# Patient Record
Sex: FEMALE | Race: WHITE | NOT HISPANIC OR LATINO | Employment: OTHER | URBAN - METROPOLITAN AREA
[De-identification: names, ages, dates, MRNs, and addresses within clinical notes are randomized per-mention and may not be internally consistent; named-entity substitution may affect disease eponyms.]

---

## 2017-10-05 ENCOUNTER — OFFICE VISIT (OUTPATIENT)
Dept: OBGYN | Facility: CLINIC | Age: 57
End: 2017-10-05
Payer: COMMERCIAL

## 2017-10-05 VITALS
HEART RATE: 71 BPM | OXYGEN SATURATION: 96 % | SYSTOLIC BLOOD PRESSURE: 118 MMHG | BODY MASS INDEX: 39.34 KG/M2 | WEIGHT: 213.8 LBS | HEIGHT: 62 IN | DIASTOLIC BLOOD PRESSURE: 82 MMHG

## 2017-10-05 DIAGNOSIS — Z01.419 ENCOUNTER FOR GYNECOLOGICAL EXAMINATION WITHOUT ABNORMAL FINDING: Primary | ICD-10-CM

## 2017-10-05 DIAGNOSIS — Z23 NEED FOR PROPHYLACTIC VACCINATION AND INOCULATION AGAINST INFLUENZA: ICD-10-CM

## 2017-10-05 DIAGNOSIS — R35.0 URINARY FREQUENCY: ICD-10-CM

## 2017-10-05 LAB
ALBUMIN UR-MCNC: NEGATIVE MG/DL
APPEARANCE UR: CLEAR
BACTERIA #/AREA URNS HPF: ABNORMAL /HPF
BILIRUB UR QL STRIP: NEGATIVE
COLOR UR AUTO: YELLOW
GLUCOSE UR STRIP-MCNC: NEGATIVE MG/DL
HGB UR QL STRIP: NEGATIVE
KETONES UR STRIP-MCNC: NEGATIVE MG/DL
LEUKOCYTE ESTERASE UR QL STRIP: ABNORMAL
NITRATE UR QL: NEGATIVE
NON-SQ EPI CELLS #/AREA URNS LPF: ABNORMAL /LPF
PH UR STRIP: 5 PH (ref 5–7)
RBC #/AREA URNS AUTO: ABNORMAL /HPF
SOURCE: ABNORMAL
SP GR UR STRIP: 1.01 (ref 1–1.03)
UROBILINOGEN UR STRIP-MCNC: NORMAL MG/DL (ref 0–2)
WBC #/AREA URNS AUTO: ABNORMAL /HPF

## 2017-10-05 PROCEDURE — 81001 URINALYSIS AUTO W/SCOPE: CPT | Performed by: OBSTETRICS & GYNECOLOGY

## 2017-10-05 PROCEDURE — 90686 IIV4 VACC NO PRSV 0.5 ML IM: CPT | Performed by: OBSTETRICS & GYNECOLOGY

## 2017-10-05 PROCEDURE — 99396 PREV VISIT EST AGE 40-64: CPT | Mod: 25 | Performed by: OBSTETRICS & GYNECOLOGY

## 2017-10-05 PROCEDURE — 90471 IMMUNIZATION ADMIN: CPT | Performed by: OBSTETRICS & GYNECOLOGY

## 2017-10-05 NOTE — MR AVS SNAPSHOT
"              After Visit Summary   10/5/2017    Amanda Simeon    MRN: [id number]           Patient Information     Date Of Birth          1960        Visit Information        Provider Department      10/5/2017 9:30 AM Christiano Thakur [place] [facility]        Today's Diagnoses     Encounter for gynecological examination without abnormal finding    -  1    Urinary frequency        Need for prophylactic vaccination and inoculation against influenza           Follow-ups after your visit        Follow-up notes from your care team     Return in about 1 year (around 10/5/2018) for Physical Exam.      Who to contact     If you have questions or need follow up information about today's clinic visit or your schedule please contact [facility] directly at [phone].  Normal or non-critical lab and imaging results will be communicated to you by MyChart, letter or phone within 4 business days after the clinic has received the results. If you do not hear from us within 7 days, please contact the clinic through [employer] or phone. If you have a critical or abnormal lab result, we will notify you by phone as soon as possible.  Submit refill requests through [employer] or call your pharmacy and they will forward the refill request to us. Please allow 3 business days for your refill to be completed.          Additional Information About Your Visit        [employer] Information     [employer] lets you send messages to your doctor, view your test results, renew your prescriptions, schedule appointments and more. To sign up, go to [email or web address] . Click on \"Log in\" on the left side of the screen, which will take you to the Welcome page. Then click on \"Sign up Now\" on the right side of the page.     You will be asked to enter the access code listed below, as well as some personal information. Please follow the directions to create your username and password.     Your access code is: " "[email or web address]  Expires: 1/3/2018  1:38 PM     Your access code will  in 90 days. If you need help or a new code, please call your [facility] clinic or [phone].        Care EveryWhere ID     This is your Care EveryWhere ID. This could be used by other organizations to access your [facility] medical records  [id number]        Your Vitals Were     Pulse Height Pulse Oximetry BMI (Body Mass Index)          71 5' 1.5\" (1.562 m) 96% 39.74 kg/m2         Blood Pressure from Last 3 Encounters:   10/05/17 118/82   10/20/16 118/74   10/08/15 120/75    Weight from Last 3 Encounters:   10/05/17 213 lb 12.8 oz (97 kg)   10/20/16 210 lb 12.8 oz (95.6 kg)   10/08/15 225 lb (102.1 kg)              We Performed the Following     *UA reflex to Microscopic and Culture ([place]; [facility]; [facility]; [facility]; [facility]; [facility]; [place]; [place])     FLU VAC, SPLIT VIRUS IM > 3 YO (QUADRIVALENT) [[id number]]     Urine Microscopic     Vaccine Administration, Initial [[id number]]        Primary Care Provider Office Phone # Fax #    [facility] [phone] [address]  [facility] [address]        Equal Access to Services     TAYLER CASTILLO : Hadii aad ku hadasho Soomaali, waaxda luqadaha, qaybta kaalmada adeegyada, waxay debin haytiera winn. So [facility] [phone].    ATENCIÓN: Si habla español, tiene a boucher disposición servicios gratuitos de asistencia lingüística. Llame al [phone].    We comply with applicable federal civil rights laws and [place] laws. We do not discriminate on the basis of race, color, national origin, age, disability, sex, sexual orientation, or gender identity.            Thank you!     Thank you for choosing [facility]  [place] your [place]. Our goal is always to provide you with excellent care. Hearing back from our patients is one way we can continue to improve our services. Please take a few minutes to complete " the written survey that you may receive in the mail after your visit with us. Thank you!             Your Updated Medication List - Protect others around you: Learn how to safely use, store and throw away your medicines at www.disposemymeds.org.          This list is accurate as of: 10/5/17  1:38 PM.  Always use your most recent med list.                   Brand Name Dispense Instructions for use Diagnosis    ASPIRIN PO      Take 81 mg by mouth daily    Rectal cancer (H)       CALCIUM + D PO      Take 2 tablets by mouth daily.        lactobacillus acidophilus Tabs      Take 1 tablet by mouth 3 times daily (before meals)    Personal history of urinary tract infection       lisinopril 10 MG tablet    PRINIVIL/ZESTRIL     Take  by mouth. Takes half tab daily.        LORazepam 0.5 MG tablet    ATIVAN    20 tablet    Take 1 tablet (0.5 mg) by mouth every 8 hours as needed for anxiety    Rectal cancer (H)       MULTIVITAL PO      Take 1 tablet by mouth daily.        niacin 250 MG tablet      Take 250 mg by mouth daily (with breakfast)        TYLENOL 325 MG tablet   Generic drug:  acetaminophen      Take 1 tablet by mouth as needed.        vitamin B complex with vitamin C Tabs tablet      Take 1 tablet by mouth daily        vitamin D 1000 UNITS capsule      Take 1 capsule by mouth daily.

## 2017-10-05 NOTE — PROGRESS NOTES
Injectable Influenza Immunization Documentation    1.  Is the person to be vaccinated sick today?   No    2. Does the person to be vaccinated have an allergy to a component   of the vaccine?   No    3. Has the person to be vaccinated ever had a serious reaction   to influenza vaccine in the past?   No    4. Has the person to be vaccinated ever had Guillain-Barré syndrome?   No    Form completed by patient  CASSANDRA Valentin 10/5/2017

## 2017-10-05 NOTE — PROGRESS NOTES
Amanda Simeon is a 57 year old female , who presents for annual exam.   No unusual bleeding, no incontinence, or unusual discharge.   She has had some urinary frequency.  She was treated with 5 day of Bactrim a couple of weeks ago, but the symptoms seem to continue.    Past Medical History:   Diagnosis Date     HTN      Rectal cancer (H) 2009     Rectovaginal fistula        Past Surgical History:   Procedure Laterality Date     HYSTERECTOMY       HYSTERECTOMY, PAP NO LONGER INDICATED       low anterior resection of colon  2009    T3N2 moderately differentiated cancer     OOPHORECTOMY      With one sided oophorectomy for fibroid     ostomy reversal  2010     TONSILLECTOMY         Obstetric History       T0      L1     SAB0   TAB0   Ectopic0   Multiple0   Live Births1       # Outcome Date GA Lbr Jacoby/2nd Weight Sex Delivery Anes PTL Lv   1  02 27w5d  2 lb 2 oz (0.964 kg) F  EPI Y MALCOLM      Complications: Abruptio Placenta          Gyn History:  Gynecological History         No LMP recorded. Patient has had a hysterectomy.     no STD/no PID/no IUD      history of abnormal pap smear:  no  Last pap:   Lab Results   Component Value Date    PAP NIL 10/03/2013           Current Outpatient Prescriptions   Medication Sig Dispense Refill     vitamin B complex with vitamin C (VITAMIN  B COMPLEX) TABS tablet Take 1 tablet by mouth daily       Lactobacillus Acid-Pectin (LACTOBACILLUS ACIDOPHILUS) TABS Take 1 tablet by mouth 3 times daily (before meals)       ASPIRIN PO Take 81 mg by mouth daily       LORazepam (ATIVAN) 0.5 MG tablet Take 1 tablet (0.5 mg) by mouth every 8 hours as needed for anxiety 20 tablet 0     niacin 250 MG tablet Take 250 mg by mouth daily (with breakfast)       Cholecalciferol (VITAMIN D) 1000 UNIT capsule Take 1 capsule by mouth daily.       acetaminophen (TYLENOL) 325 MG tablet Take 1 tablet by mouth as needed.       LISINOPRIL 10 MG OR TABS Take   "by mouth. Takes half tab daily.        MULTIVITAL OR Take 1 tablet by mouth daily.       Calcium-Vitamin D (CALCIUM + D PO) Take 2 tablets by mouth daily.         Allergies   Allergen Reactions     Dilaudid [Hydromorphone Hcl] Rash       Social History     Social History     Marital status:      Spouse name: Wong     Number of children: 1     Years of education: N/A     Occupational History      Unemployed      Stay At Home Parent     Social History Main Topics     Smoking status: Never Smoker     Smokeless tobacco: Never Used     Alcohol use No     Drug use: No     Sexual activity: Yes     Partners: Male     Birth control/ protection: Surgical     Other Topics Concern      Service No     Blood Transfusions Yes     Caffeine Concern No     Occupational Exposure No     Hobby Hazards No     Sleep Concern No     Stress Concern Yes     Special Diet No     Back Care No     Exercise Not Asked     walks several times a week     Bike Helmet Yes     Seat Belt Yes     Self-Exams Yes     Social History Narrative       Family History   Problem Relation Age of Onset     CANCER Paternal Grandmother      Colon cancer age 56     Cardiovascular Mother      CHF     Arthritis Mother      Cardiovascular Father      CHF     Arthritis Father      Neurologic Disorder Brother 53     Parkinsons     CANCER Brother      NMSC         ROS:  All negative except as above.      EXAM:  /82 (BP Location: Right arm, Cuff Size: Adult Regular)  Pulse 71  Ht 5' 1.5\" (1.562 m)  Wt 213 lb 12.8 oz (97 kg)  SpO2 96%  BMI 39.74 kg/m2  General:  WNWD female, NAD  Alert  Oriented x 3  Gait:  Normal  Skin:  Normal skin turgor  Neurologic:  CN grossly intact, good sensation.    HEENT:  NC/AT, EOMI  Neck:  No masses palpated, symmetrical, carotids +2/4, no bruits heard  Heart:  RRR  Lungs:  Clear   Breasts:  Symmetrical, no dimpling noted, no masses palpated, no discharge expressed  Abdomen:  Non-tender, non-distended.  Vulva: No " external lesions, normal hair distribution, no adenopathy  BUS:  Normal, no masses noted  Urethra:  No hypermobility noted  Urethral meatus:  No masses noted  Vagina: atrophic changes.  No masses noted.   Cervix: absent   Uterus: absent   Ovaries/Bimanual: No mass, non-tender, mobile  Perianal:  No masses noted.    Rectal exam: declined since she will have the colonoscopy in March.   Extremities:  No clubbing, cyanosis, or edema      ASSESSMENT/PLAN   Annual examination   Check U/A today since the frequency continues.  The U/A results are negative.  If symptoms continue, then repeat.    Low fat diet, weight bearing exercises and self breast exams on a monthly basis have been recommended.  I have discussed with patient the risks, benefits, medications, treatment options and modalities.   I have instructed the patient to call or schedule a follow-up appointment if any problems.

## 2017-10-31 ENCOUNTER — TRANSFERRED RECORDS (OUTPATIENT)
Dept: HEALTH INFORMATION MANAGEMENT | Facility: CLINIC | Age: 57
End: 2017-10-31

## 2018-02-26 ENCOUNTER — TELEPHONE (OUTPATIENT)
Dept: OBGYN | Facility: CLINIC | Age: 58
End: 2018-02-26

## 2018-02-26 NOTE — TELEPHONE ENCOUNTER
[facility] Call Center    Phone Message    May a detailed message be left on voicemail: no    Reason for Call: Other: Patient has vaginal bleeding.  Asking for a nurse to call her back.  Did not want FNA.  Please call. [phone]     Action Taken: Message routed to:  [facility] p [phone]

## 2018-02-26 NOTE — TELEPHONE ENCOUNTER
"Return call to patient. Reported \"The strangest most bizarre thing.\" Patient reported hx of colorectal cancer in 2009 & a known fistula which she has discussed with Dr. Thakur. Patient stated she has colonoscopy 03-19-18 with Dr. Mcfarland. Patient reported episodes where she has to strain when passing stool and then will note blood on the toilet tissue. She has to wipe 2-3 times before all the blood is gone. Patient stated these episodes were about 8-10 days apart. She had 2 episodes within 48 hrs and a total of 5 episodes recently. Patient stated she was advised by Dr. Mcfarland to consult GYN while she waits for colonoscopy to make sure blood is not coming from vaginal area. Patient stated she does have one ovary. Offered patient an appt tomorrow with Dr. Thakur. Patient declined due to distance from clinic. Requested to be seen at [facility]. Scheduled per patient's request on 03-01-18 at 1015. Patient appreciative of assistance. Amy Toth RN, BAN    "

## 2018-02-28 ENCOUNTER — TELEPHONE (OUTPATIENT)
Dept: SURGERY | Facility: CLINIC | Age: 58
End: 2018-02-28

## 2018-02-28 DIAGNOSIS — Z85.048 HISTORY OF RECTAL CANCER: Primary | ICD-10-CM

## 2018-02-28 NOTE — TELEPHONE ENCOUNTER
Per task, patient is scheduled 3/13/18 at 1:00 pm ([place]).  Called and spoke with patient.  Patient confirms date, time, location.  Informed patient she will receive appointment information in the mail as well.  Patient verbalized understanding.

## 2018-02-28 NOTE — PROGRESS NOTES
Called patient and talked with her about her symptoms she is experiencing the last several months. . She states that She is having both blood and stool out her vagina. She says that this is happening every week to two weeks. She does say that her stool constituency has changed, they have become harder, and she does have to strain at times. Patient states that she is seeing gyn tomorrow.  Patient symptoms were discussed with Dr. Mcfarland and he would like the patient to have a 3T MRI before the colonoscopy she has scheduled on 3/19. Patient verbalizes understanding of plan.

## 2018-02-28 NOTE — TELEPHONE ENCOUNTER
----- Message from Emily Perez RN sent at 2/28/2018 10:40 AM CST -----  Hello,    Can you please schedule her for a 3 T MRI. I would have just had the patient do it but we need it before 3/19 when she is scheduled for the colonoscopy. Patient states that Wednesdays and this Friday do not work for her:)    Thanks,  Emily

## 2018-03-01 ENCOUNTER — TELEPHONE (OUTPATIENT)
Dept: OBGYN | Facility: CLINIC | Age: 58
End: 2018-03-01

## 2018-03-01 ENCOUNTER — OFFICE VISIT (OUTPATIENT)
Dept: OBGYN | Facility: CLINIC | Age: 58
End: 2018-03-01
Payer: COMMERCIAL

## 2018-03-01 VITALS
BODY MASS INDEX: 38.66 KG/M2 | SYSTOLIC BLOOD PRESSURE: 115 MMHG | HEART RATE: 88 BPM | WEIGHT: 208 LBS | DIASTOLIC BLOOD PRESSURE: 77 MMHG | OXYGEN SATURATION: 98 %

## 2018-03-01 DIAGNOSIS — N95.2 VAGINAL ATROPHY: Primary | ICD-10-CM

## 2018-03-01 DIAGNOSIS — N82.4 DIGESTIVE-GENITAL TRACT FISTULA, FEMALE: ICD-10-CM

## 2018-03-01 DIAGNOSIS — C20 RECTAL CANCER (H): ICD-10-CM

## 2018-03-01 DIAGNOSIS — N93.9 VAGINAL BLEEDING: Primary | ICD-10-CM

## 2018-03-01 PROCEDURE — 99214 OFFICE O/P EST MOD 30 MIN: CPT | Performed by: OBSTETRICS & GYNECOLOGY

## 2018-03-01 RX ORDER — ESTRADIOL 10 UG/1
INSERT VAGINAL
Qty: 24 TABLET | Refills: 0 | Status: SHIPPED | OUTPATIENT
Start: 2018-03-01 | End: 2018-05-03

## 2018-03-01 NOTE — NURSING NOTE
"Chief Complaint   Patient presents with     Abnormal Uterine Bleeding     Had bleeding last Saturday Dr. Mcfarland he [place] doctor wanted her seen.       Initial /77 (BP Location: Right arm, Cuff Size: Adult Large)  Pulse 88  Wt 208 lb (94.3 kg)  SpO2 98%  BMI 38.66 kg/m2 Estimated body mass index is 38.66 kg/(m^2) as calculated from the following:    Height as of 10/5/17: 5' 1.5\" (1.562 m).    Weight as of this encounter: 208 lb (94.3 kg).  Medication Reconciliation: complete   CASSANDRA Valentin 3/1/2018         "

## 2018-03-01 NOTE — TELEPHONE ENCOUNTER
[facility] Call Center    Phone Message    May a detailed message be left on voicemail: yes    Reason for Call: Other: patient has ? regarding script from appt today- please advise.     Action Taken: Message routed to:  [facility] p [phone]

## 2018-03-01 NOTE — PROGRESS NOTES
Amanda Simeon is a 57 year old female, .  She presents today at the request of Dr. Mcfarland.    On  she had some spotting (dull red with wiping) and had urinary frequency and urgency.  Last fall when she had a UTI, she had some blood.  She did not have dysuria.   She was started on Cipro and a week later, she had UTI noted.    She had inserted a tampon and she had some blood on the applicator.  She checked for the tampon.  The tampon was lost and likely expelled.   About 8 days later she had some blood again and she had noted some stool with the blood.  It resolved.  Last week it happened again.    She tried Metamucil and lots of water.  She had similar blood a couple of days later.  It was less than previously.   GI wanted her to be seen by Gynecology to rule out gyn etiology.  She had a hysterectomy .    She had the diagnosis of rectal cancer in  with the reanastomosis.  She has a history of a fistula.        Past Medical History:   Diagnosis Date     HTN      Rectal cancer (H) 2009     Rectovaginal fistula        Past Surgical History:   Procedure Laterality Date     HYSTERECTOMY       HYSTERECTOMY, PAP NO LONGER INDICATED       low anterior resection of colon  2009    T3N2 moderately differentiated cancer     OOPHORECTOMY      With one sided oophorectomy for fibroid     ostomy reversal  2010     TONSILLECTOMY         Obstetric History       T0      L1     SAB0   TAB0   Ectopic0   Multiple0   Live Births1       # Outcome Date GA Lbr Jacoby/2nd Weight Sex Delivery Anes PTL Lv   1  02 27w5d  2 lb 2 oz (0.964 kg) F  EPI Y MALCOLM      Complications: Abruptio Placenta           Allergies   Allergen Reactions     Dilaudid [Hydromorphone Hcl] Rash       Current Outpatient Prescriptions   Medication Sig Dispense Refill     vitamin B complex with vitamin C (VITAMIN  B COMPLEX) TABS tablet Take 1 tablet by mouth daily       Lactobacillus Acid-Pectin  (LACTOBACILLUS ACIDOPHILUS) TABS Take 1 tablet by mouth 3 times daily (before meals)       ASPIRIN PO Take 81 mg by mouth daily       LORazepam (ATIVAN) 0.5 MG tablet Take 1 tablet (0.5 mg) by mouth every 8 hours as needed for anxiety 20 tablet 0     niacin 250 MG tablet Take 250 mg by mouth daily (with breakfast)       Cholecalciferol (VITAMIN D) 1000 UNIT capsule Take 1 capsule by mouth daily.       Calcium-Vitamin D (CALCIUM + D PO) Take 2 tablets by mouth daily.       acetaminophen (TYLENOL) 325 MG tablet Take 1 tablet by mouth as needed.       LISINOPRIL 10 MG OR TABS Take  by mouth. Takes half tab daily.        MULTIVITAL OR Take 1 tablet by mouth daily.         Social History     Social History     Marital status:      Spouse name: Wong     Number of children: 1     Years of education: N/A     Occupational History      Unemployed      Stay At Home Parent     Social History Main Topics     Smoking status: Never Smoker     Smokeless tobacco: Never Used     Alcohol use No     Drug use: No     Sexual activity: Yes     Partners: Male     Birth control/ protection: Surgical     Other Topics Concern      Service No     Blood Transfusions Yes     Caffeine Concern No     Occupational Exposure No     Hobby Hazards No     Sleep Concern No     Stress Concern Yes     Special Diet No     Back Care No     Exercise Not Asked     walks several times a week     Bike Helmet Yes     Seat Belt Yes     Self-Exams Yes     Social History Narrative       Family History   Problem Relation Age of Onset     CANCER Paternal Grandmother      Colon cancer age 56     Cardiovascular Mother      CHF     Arthritis Mother      Cardiovascular Father      CHF     Arthritis Father      Neurologic Disorder Brother 53     Parkinsons     CANCER Brother      NMSC         Review of Systems:  10 point ROS of systems including Constitutional, Eyes, Respiratory, Cardiovascular, Gastroenterology, Genitourinary, Integumentary,  Muscularskeletal, Psychiatric were all negative except for pertinent positives noted in my HPI and in the PMH.          EXAM:  /77 (BP Location: Right arm, Cuff Size: Adult Large)  Pulse 88  Wt 208 lb (94.3 kg)  SpO2 98%  BMI 38.66 kg/m2  Body mass index is 38.66 kg/(m^2).  General Appearance:  healthy, alert, active, no distress  Skin:  Normal skin turgor  Neuro:  Alert, cranial nerves grossly intact  HEENT: NCAT  Neck:  No masses or lesions carotids are +2/4. No bruits heard  Lungs:  Good respiratory effort   Abdomen: Soft, nontender.  Normal bowel sounds.  No masses  Vulva: No external lesions, normal hair distribution, no adenopathy  BUS:  Normal, no masses noted  Urethral meatus:  No masses noted  Urethra:  No hypermobility  Vagina: bleeding is seen from the vaginal cuff likely with irritation of the speculum.    Cervix: absent   Uterus: absent   Ovaries/Bimanual: No masses  Extremities:  No clubbing, cyanosis or edema.        ASSESSMENT:  Vaginal bleeding   History of rectal cancer  History of fistula       PLAN:  With the vaginal irritation with the speculum, I suspect the bleeding is vaginal origin.   I suggest to use the topical estrogen to see if that might help.  Vaginal cream and tablets discussed.  She is interested in the Vagifem tablets.  This should help if the bleeding is from atrophic changes.  Medication information reviewed with her.  She will continue with the plan for the colonoscopy and the GI evaluation.  Should the bleeding continue, then she needs to return for further evaluation.       Christiano Thakur MD

## 2018-03-06 RX ORDER — ESTRADIOL 0.1 MG/G
CREAM VAGINAL DAILY
Qty: 60 G | Refills: 1 | Status: SHIPPED | OUTPATIENT
Start: 2018-03-06 | End: 2021-10-14

## 2018-03-06 NOTE — TELEPHONE ENCOUNTER
I called patient  We reviewed options.  She is willing to go with the estrace cream.  Instructions reviewed.   Christiano Thakur MD

## 2018-03-08 ENCOUNTER — TELEPHONE (OUTPATIENT)
Dept: OBGYN | Facility: CLINIC | Age: 58
End: 2018-03-08

## 2018-03-08 NOTE — TELEPHONE ENCOUNTER
estradiol (ESTRACE) 0.1 MG/GM cream 60 g 1 3/6/2018  --   Sig: Place vaginally daily     Patient called wondering how much med to use as vaginal applicator has 1-4 gm on it. Explained will clarify with Dr. Thakur and call back with orders. Can leave message on cell at time of call back if needed.   Amy Toth RN, BAN

## 2018-03-08 NOTE — TELEPHONE ENCOUNTER
Message handled by Nurse Triage with [facility] - provider name: Dr. Thakur   2 grams daily. Patient is to return to clinic in April after she sees GI for recheck.  Phone to patient. Gave orders per Dr. Thakur as below. Patient stated Rx cost $258. Assisted patient with scheduling a f/u appt on 04-19-18. Patient appreciative of assistance. Amy Toth RN, CORBY

## 2018-03-12 ENCOUNTER — TELEPHONE (OUTPATIENT)
Dept: GASTROENTEROLOGY | Facility: OUTPATIENT CENTER | Age: 58
End: 2018-03-12

## 2018-03-13 ENCOUNTER — HOSPITAL ENCOUNTER (OUTPATIENT)
Dept: MRI IMAGING | Facility: CLINIC | Age: 58
Discharge: HOME | End: 2018-03-13
Attending: COLON & RECTAL SURGERY | Admitting: COLON & RECTAL SURGERY
Payer: COMMERCIAL

## 2018-03-13 DIAGNOSIS — Z85.048 HISTORY OF RECTAL CANCER: ICD-10-CM

## 2018-03-13 PROCEDURE — 25000128 H RX IP 250 OP 636: Performed by: RADIOLOGY

## 2018-03-13 PROCEDURE — A9585 GADOBUTROL INJECTION: HCPCS | Performed by: RADIOLOGY

## 2018-03-13 PROCEDURE — 72197 MRI PELVIS W/O & W/DYE: CPT

## 2018-03-13 RX ORDER — GADOBUTROL 604.72 MG/ML
10 INJECTION INTRAVENOUS ONCE
Status: COMPLETED | OUTPATIENT
Start: 2018-03-13 | End: 2018-03-13

## 2018-03-13 RX ADMIN — GADOBUTROL 10 ML: 604.72 INJECTION INTRAVENOUS at 14:11

## 2018-03-19 ENCOUNTER — DOCUMENTATION ONLY (OUTPATIENT)
Dept: GASTROENTEROLOGY | Facility: OUTPATIENT CENTER | Age: 58
End: 2018-03-19
Payer: COMMERCIAL

## 2018-03-19 ENCOUNTER — TRANSFERRED RECORDS (OUTPATIENT)
Dept: HEALTH INFORMATION MANAGEMENT | Facility: CLINIC | Age: 58
End: 2018-03-19

## 2018-03-21 LAB — COPATH REPORT: NORMAL

## 2018-04-03 ENCOUNTER — TELEPHONE (OUTPATIENT)
Dept: SURGERY | Facility: CLINIC | Age: 58
End: 2018-04-03

## 2018-04-03 NOTE — TELEPHONE ENCOUNTER
Amanda is calling for Dr. Mcfarland.  She reports she had MRI prior to colonoscopy due to possible vaginal bleeding.  After colonoscopy thought maybe hemorrhoid near fistula was the cause of bleeding?    1. Advised metamucil, any other recommendations?  2. Need follow up in clinic?  3. At what point is there too much bleeding?  4. Her Gyn put her on estradiol cream at a cost of $258 per month.  She does not feel it's helping her bleeding issue.  At this cost would prefer changing to something else.  Gyn would need to order.  5. Bleeding originally sporadic every 5-7 days.  Then in January every 12-14 days.  Then February more frequent every 7-10 days. Since colonoscopy 3/19 has been 14 days.    6. Still unclear to here where bleeding is coming from.  Not always with BM, light amount of blood with wiping. She suspects not from vaginal area.  7.  She thought she understood there was suspicion prior / during colonoscopy, but nothing on report.     8. Would like to clarify this and have a plan.      Will route to Dr. Bruna Diaz's clinic nurse.

## 2018-05-03 ENCOUNTER — OFFICE VISIT (OUTPATIENT)
Dept: OBGYN | Facility: CLINIC | Age: 58
End: 2018-05-03
Payer: COMMERCIAL

## 2018-05-03 VITALS
HEART RATE: 79 BPM | DIASTOLIC BLOOD PRESSURE: 79 MMHG | BODY MASS INDEX: 39.15 KG/M2 | WEIGHT: 210.6 LBS | OXYGEN SATURATION: 98 % | SYSTOLIC BLOOD PRESSURE: 119 MMHG

## 2018-05-03 DIAGNOSIS — N95.2 VAGINAL ATROPHY: Primary | ICD-10-CM

## 2018-05-03 DIAGNOSIS — N89.8 VAGINAL IRRITATION: ICD-10-CM

## 2018-05-03 DIAGNOSIS — N93.9 VAGINAL BLEEDING: ICD-10-CM

## 2018-05-03 PROCEDURE — 99213 OFFICE O/P EST LOW 20 MIN: CPT | Performed by: OBSTETRICS & GYNECOLOGY

## 2018-05-03 NOTE — NURSING NOTE
"Chief Complaint   Patient presents with     RECHECK     follow up estrace vaginal cream for bleeding       Initial /79 (BP Location: Right arm, Cuff Size: Adult Large)  Pulse 79  Wt 210 lb 9.6 oz (95.5 kg)  SpO2 98%  BMI 39.15 kg/m2 Estimated body mass index is 39.15 kg/(m^2) as calculated from the following:    Height as of 10/5/17: 5' 1.5\" (1.562 m).    Weight as of this encounter: 210 lb 9.6 oz (95.5 kg).  Medication Reconciliation: complete   CASSANDRA Valentin 5/3/2018         "

## 2018-05-06 NOTE — PROGRESS NOTES
Amanda is a 57 year old  here for follow up of vaginal estrogen cream.  She has seen her GI physician and no evidence of fistula is seen.    She had 3 episodes of bleeding in .  It was a lighter pink color.  She uses the estrogen cream 1-2 times a week.  She has had less irritations of the vulvar and vaginal tissues with the use.  She has no contraindications to continue use of the cream.   She would like to continue with the medication, buth er  works for [employer] and is expected to have a forced residential at age 59.   He was training for another position, but a supervisor from another site had another individual in mind for the hire.  They will likely have some cobra coverage, but she isn't sure about how long or what coverage will look like.         Past Medical History:   Diagnosis Date     HTN      Rectal cancer (H) 2009     Rectovaginal fistula        Past Surgical History:   Procedure Laterality Date     HYSTERECTOMY       HYSTERECTOMY, PAP NO LONGER INDICATED       low anterior resection of colon  2009    T3N2 moderately differentiated cancer     OOPHORECTOMY      With one sided oophorectomy for fibroid     ostomy reversal  2010     TONSILLECTOMY            Allergies   Allergen Reactions     Dilaudid [Hydromorphone Hcl] Rash       Current Outpatient Prescriptions   Medication Sig Dispense Refill     acetaminophen (TYLENOL) 325 MG tablet Take 1 tablet by mouth as needed.       ASPIRIN PO Take 81 mg by mouth daily       Calcium-Vitamin D (CALCIUM + D PO) Take 2 tablets by mouth daily.       Cholecalciferol (VITAMIN D) 1000 UNIT capsule Take 1 capsule by mouth daily.       estradiol (ESTRACE) 0.1 MG/GM cream Place vaginally daily 60 g 1     Lactobacillus Acid-Pectin (LACTOBACILLUS ACIDOPHILUS) TABS Take 1 tablet by mouth 3 times daily (before meals)       LISINOPRIL 10 MG OR TABS Take  by mouth. Takes half tab daily.        LORazepam (ATIVAN) 0.5 MG tablet Take 1  tablet (0.5 mg) by mouth every 8 hours as needed for anxiety 20 tablet 0     MULTIVITAL OR Take 1 tablet by mouth daily.       niacin 250 MG tablet Take 250 mg by mouth daily (with breakfast)       vitamin B complex with vitamin C (VITAMIN  B COMPLEX) TABS tablet Take 1 tablet by mouth daily         Social History     Social History     Marital status:      Spouse name: Wong     Number of children: 1     Years of education: N/A     Occupational History      Unemployed      Stay At Home Parent     Social History Main Topics     Smoking status: Never Smoker     Smokeless tobacco: Never Used     Alcohol use No     Drug use: No     Sexual activity: Yes     Partners: Male     Birth control/ protection: Surgical     Other Topics Concern      Service No     Blood Transfusions Yes     Caffeine Concern No     Occupational Exposure No     Hobby Hazards No     Sleep Concern No     Stress Concern Yes     Special Diet No     Back Care No     Exercise Not Asked     walks several times a week     Bike Helmet Yes     Seat Belt Yes     Self-Exams Yes     Social History Narrative       Family History   Problem Relation Age of Onset     CANCER Paternal Grandmother      Colon cancer age 56     Cardiovascular Mother      CHF     Arthritis Mother      Cardiovascular Father      CHF     Arthritis Father      Neurologic Disorder Brother 53     Parkinsons     CANCER Brother      NMSC       Review of Systems:  10 point ROS of systems including Constitutional, Eyes, Respiratory, Cardiovascular, Gastroenterology, Genitourinary, Integumentary, Muscularskeletal, Psychiatric were all negative except for pertinent positives noted in my HPI and in the PMH.      Exam  /79 (BP Location: Right arm, Cuff Size: Adult Large)  Pulse 79  Wt 210 lb 9.6 oz (95.5 kg)  SpO2 98%  BMI 39.15 kg/m2  General:  WNWD female, NAD  Alert  Oriented x 3  Gait:  Normal  Skin:  Normal skin turgor  HEENT:  NC/AT, EOMI  Lungs:  Good respiratory  effort   Abdomen:  Non-tender, non-distended.  Pelvic exam:  Not performed, since symptoms have improved.   Extremities:  No clubbing, no cyanosis and no edema.      Assessment  Vaginal atrophy  Vaginal bleeding  Vaginal irritation      Plan  At this time she desires to continue with the estrogen cream.  We might need to look at some alternatives should her insurance coverage change drastically.  She will let me know questions.     TT 20-25 min  CT greater than 50%    MD Christiano Ambriz MD

## 2018-07-12 ENCOUNTER — TELEPHONE (OUTPATIENT)
Dept: OBGYN | Facility: CLINIC | Age: 58
End: 2018-07-12

## 2018-07-12 DIAGNOSIS — N95.2 VAGINAL ATROPHY: ICD-10-CM

## 2018-07-12 DIAGNOSIS — N93.9 VAGINAL BLEEDING: ICD-10-CM

## 2018-07-12 NOTE — TELEPHONE ENCOUNTER
ESTRADIOL 0.1MG/GM CREAM 60 g 3 5/6/2018  --   Sig: Insert 1 gram vaginally 2 times each week     Unsure if Dr. Thakur can order a larger tube. Preferred pharmacy entered.   Will route to Dr. hTakur for review & orders. Amy Toth RN, BAN

## 2018-07-12 NOTE — TELEPHONE ENCOUNTER
"Patient is requesting that the [facility]adoil [facility]- please let patient know- she gets 25 applications out of one tube, she is requesting that two tubes be ordered for one \"dose\"  "

## 2018-07-12 NOTE — TELEPHONE ENCOUNTER
I don't think I can write it that way as she is going to use it twice weekly.   Christiano Thakur MD

## 2018-07-13 NOTE — TELEPHONE ENCOUNTER
Rx reordered and sent to mail order pharmacy, pt was informed.    Marybeth Hernandez  [facility] RN

## 2018-07-13 NOTE — TELEPHONE ENCOUNTER
Notified patient of Dr. Thakur's note below.  Patient understood.  Patient would like RX transferred to [facility] as is- as it's cheaper than the local pharmacy.  Will have OB RN redo prescription. Pharmacy updated.

## 2018-10-18 ENCOUNTER — OFFICE VISIT (OUTPATIENT)
Dept: OBGYN | Facility: CLINIC | Age: 58
End: 2018-10-18
Payer: COMMERCIAL

## 2018-10-18 VITALS
OXYGEN SATURATION: 96 % | DIASTOLIC BLOOD PRESSURE: 74 MMHG | BODY MASS INDEX: 39.56 KG/M2 | SYSTOLIC BLOOD PRESSURE: 118 MMHG | HEART RATE: 73 BPM | WEIGHT: 215 LBS | HEIGHT: 62 IN

## 2018-10-18 DIAGNOSIS — Z01.411 ENCOUNTER FOR GYNECOLOGICAL EXAMINATION WITH ABNORMAL FINDING: Primary | ICD-10-CM

## 2018-10-18 DIAGNOSIS — N95.2 VAGINAL ATROPHY: ICD-10-CM

## 2018-10-18 PROCEDURE — 99396 PREV VISIT EST AGE 40-64: CPT | Performed by: OBSTETRICS & GYNECOLOGY

## 2018-10-18 NOTE — PROGRESS NOTES
Amanda Simeon is a 58 year old female , who presents for annual exam.   No unusual bleeding, no incontinence, or unusual discharge.   She is currently using the estradiol cream for the vaginal symptoms.  She has been using 1 gm a week and no bleeding.   She had her colonoscopy and no concerning findings were seen.     Past Medical History:   Diagnosis Date     HTN      Rectal cancer (H) 2009     Rectovaginal fistula        Past Surgical History:   Procedure Laterality Date     HYSTERECTOMY       HYSTERECTOMY, PAP NO LONGER INDICATED       low anterior resection of colon  2009    T3N2 moderately differentiated cancer     OOPHORECTOMY      With one sided oophorectomy for fibroid     ostomy reversal  2010     TONSILLECTOMY         Obstetric History       T0      L1     SAB0   TAB0   Ectopic0   Multiple0   Live Births1       # Outcome Date GA Lbr Jacoby/2nd Weight Sex Delivery Anes PTL Lv   1  02 27w5d  2 lb 2 oz (0.964 kg) F  EPI Y MALCOLM      Complications: Abruptio Placenta          Gyn History:  Gynecological History         No LMP recorded. Patient has had a hysterectomy.     no STD /no PID/no IUD      history of abnormal pap smear:  no  Last pap:   Lab Results   Component Value Date    PAP NIL 10/03/2013           Current Outpatient Prescriptions   Medication Sig Dispense Refill     acetaminophen (TYLENOL) 325 MG tablet Take 1 tablet by mouth as needed.       ASPIRIN PO Take 81 mg by mouth daily       Calcium-Vitamin D (CALCIUM + D PO) Take 2 tablets by mouth daily.       Cholecalciferol (VITAMIN D) 1000 UNIT capsule Take 1 capsule by mouth daily.       estradiol (ESTRACE) 0.1 MG/GM cream Place vaginally daily 60 g 1     ESTRADIOL 0.1MG/GM CREAM Insert 1 gram vaginally 2 times each week 60 g 3     Lactobacillus Acid-Pectin (LACTOBACILLUS ACIDOPHILUS) TABS Take 1 tablet by mouth 3 times daily (before meals)       LISINOPRIL 10 MG OR TABS Take  by mouth. Takes half  "tab daily.        LORazepam (ATIVAN) 0.5 MG tablet Take 1 tablet (0.5 mg) by mouth every 8 hours as needed for anxiety 20 tablet 0     MULTIVITAL OR Take 1 tablet by mouth daily.       vitamin B complex with vitamin C (VITAMIN  B COMPLEX) TABS tablet Take 1 tablet by mouth daily         Allergies   Allergen Reactions     Dilaudid [Hydromorphone Hcl] Rash       Social History     Social History     Marital status:      Spouse name: Wong     Number of children: 1     Years of education: N/A     Occupational History      Unemployed      Stay At Home Parent     Social History Main Topics     Smoking status: Never Smoker     Smokeless tobacco: Never Used     Alcohol use No     Drug use: No     Sexual activity: Yes     Partners: Male     Birth control/ protection: Surgical     Other Topics Concern      Service No     Blood Transfusions Yes     Caffeine Concern No     Occupational Exposure No     Hobby Hazards No     Sleep Concern No     Stress Concern Yes     Special Diet No     Back Care No     Exercise Not Asked     walks several times a week     Bike Helmet Yes     Seat Belt Yes     Self-Exams Yes     Social History Narrative       Family History   Problem Relation Age of Onset     Cancer Paternal Grandmother      Colon cancer age 56     Cardiovascular Mother      CHF     Arthritis Mother      Cardiovascular Father      CHF     Arthritis Father      Neurologic Disorder Brother 53     Parkinsons     Cancer Brother      NMSC         ROS:  All negative except as above.      EXAM:  /74 (BP Location: Right arm, Patient Position: Chair, Cuff Size: Adult Large)  Pulse 73  Ht 5' 1.5\" (1.562 m)  Wt 215 lb (97.5 kg)  SpO2 96%  Breastfeeding? No  BMI 39.97 kg/m2  General:  WNWD female, NAD  Alert  Oriented x 3  Gait:  Normal  Skin:  Normal skin turgor  Neurologic:  CN grossly intact, good sensation.    HEENT:  NC/AT, EOMI  Neck:  No masses palpated, symmetrical, carotids +2/4, no bruits heard  Heart:  " RRR  Lungs:  Clear   Breasts:  Symmetrical, no dimpling noted, no masses palpated, no discharge expressed  Abdomen:  Non-tender, non-distended.  Vulva: No external lesions, normal hair distribution, no adenopathy  BUS:  Normal, no masses noted  Urethra:  No hypermobility noted  Urethral meatus:  No masses noted  Vagina: Atrophic, no abnormal discharge, no lesions  Cervix: absent   Uterus: absent   Ovaries: No mass, non-tender, mobile  Perianal:  No masses noted.   Extremities:  No clubbing, cyanosis, or edema      ASSESSMENT/PLAN   Annual examination   Low fat diet, weight bearing exercises and self breast exams on a monthly basis have been recommended.  I have discussed with patient the risks, benefits, medications, treatment options and modalities.   I have instructed the patient to call or schedule a follow-up appointment if any problems.

## 2018-10-18 NOTE — MR AVS SNAPSHOT
After Visit Summary   10/18/2018    Amanda Simeon    MRN: [id number]           Patient Information     Date Of Birth          1960        Visit Information        Provider Department      10/18/2018 9:30 AM Christiano Thakur MD [facility]        Today's Diagnoses     Encounter for gynecological examination with abnormal finding    -  1    Vaginal atrophy          Care Instructions    If you have any questions regarding your visit, Please contact your care team.    [facility]: [phone]    Women s Health  TELEPHONE NUMBER   XENA Ambriz-    Daija López-Medical Assistant      [facility]  [address]  [place], [place] [id number] [phone] ask for [facility]    Imaging [phone]    [facility]  [address] [place] [address] Dr.  [place], [place] [address] [phone]    [address]  [place] [id number] [phone] ask for [facility]  Imaging [phone]     Urgent Care locations:    [facility] Monday-Friday  5 pm - 9 pm  Saturday and Sunday   9 am - 5 pm    Monday-Friday   11 am - 9 pm  Saturday and Sunday   9 am - 5 pm   [phone]       If you need a medication refill, please contact your pharmacy. Please allow 3 business days for your refill to be completed.  As always, Thank you for trusting us with your healthcare needs!            Follow-ups after your visit        Follow-up notes from your care team     Return in about 1 year (around 10/18/2019) for Physical Exam.      Who to contact     If you have questions or need follow up information about today's clinic visit or your schedule please contact [facility] directly at [phone].  Normal or non-critical lab and imaging results will be communicated to you by MyChart, letter or phone within 4 business days  "after the clinic has received the results. If you do not hear from us within 7 days, please contact the clinic through [employer] or phone. If you have a critical or abnormal lab result, we will notify you by phone as soon as possible.  Submit refill requests through [employer] or call your pharmacy and they will forward the refill request to us. Please allow 3 business days for your refill to be completed.          Additional Information About Your Visit        [employer] Information     [employer] lets you send messages to your doctor, view your test results, renew your prescriptions, schedule appointments and more. To sign up, go to [email or web address] . Click on \"Log in\" on the left side of the screen, which will take you to the Welcome page. Then click on \"Sign up Now\" on the right side of the page.     You will be asked to enter the access code listed below, as well as some personal information. Please follow the directions to create your username and password.     Your access code is: [email or web address]  Expires: 2019 11:52 AM     Your access code will  in 90 days. If you need help or a new code, please call your [facility] clinic or [phone].        Care EveryWhere ID     This is your Care EveryWhere ID. This could be used by other organizations to access your [facility] medical records  [id number]        Your Vitals Were     Pulse Height Pulse Oximetry Breastfeeding? BMI (Body Mass Index)       73 5' 1.5\" (1.562 m) 96% No 39.97 kg/m2        Blood Pressure from Last 3 Encounters:   10/18/18 118/74   18 119/79   18 115/77    Weight from Last 3 Encounters:   10/18/18 215 lb (97.5 kg)   18 210 lb 9.6 oz (95.5 kg)   18 208 lb (94.3 kg)              Today, you had the following     No orders found for display         Today's Medication Changes          These changes are accurate as of 10/18/18 11:52 AM.  If you have any questions, ask your nurse or doctor.               Stop taking these " medicines if you haven't already. Please contact your care team if you have questions.     niacin 250 MG tablet   Stopped by:  Christiano Thakur MD                    Primary Care Provider Office Phone # Fax #    [facility] [phone] [address]  [facility] [address]        Equal Access to Services     [facility]: Hadii aad ku hadasho Soomaali, waaxda luqadaha, qaybta kaalmada adeegyada, waxay idiin hayaan adeeg kharash la'aan . So [facility] [phone].    ATENCIÓN: Si habla español, tiene a boucher disposición servicios gratuitos de asistencia lingüística. Ridge al [phone].    We comply with applicable federal civil rights laws and [place] laws. We do not discriminate on the basis of race, color, national origin, age, disability, sex, sexual orientation, or gender identity.            Thank you!     Thank you for choosing [facility]  for your care. Our goal is always to provide you with excellent care. Hearing back from our patients is one way we can continue to improve our services. Please take a few minutes to complete the written survey that you may receive in the mail after your visit with us. Thank you!             Your Updated Medication List - Protect others around you: Learn how to safely use, store and throw away your medicines at www.disposemymeds.org.          This list is accurate as of 10/18/18 11:52 AM.  Always use your most recent med list.                   Brand Name Dispense Instructions for use Diagnosis    ASPIRIN PO      Take 81 mg by mouth daily    Rectal cancer (H)       CALCIUM + D PO      Take 2 tablets by mouth daily.        estradiol 0.1 MG/GM cream    ESTRACE    60 g    Place vaginally daily    Vaginal atrophy       ESTRADIOL 0.1MG/GM CREAM     60 g    Insert 1 gram vaginally 2 times each week    Vaginal atrophy, Vaginal bleeding       lactobacillus acidophilus Tabs      Take 1 tablet by mouth 3 times daily  (before meals)    Personal history of urinary tract infection       lisinopril 10 MG tablet    PRINIVIL/ZESTRIL     Take  by mouth. Takes half tab daily.        LORazepam 0.5 MG tablet    ATIVAN    20 tablet    Take 1 tablet (0.5 mg) by mouth every 8 hours as needed for anxiety    Rectal cancer (H)       MULTIVITAL PO      Take 1 tablet by mouth daily.        TYLENOL 325 MG tablet   Generic drug:  acetaminophen      Take 1 tablet by mouth as needed.        vitamin B complex with vitamin C Tabs tablet      Take 1 tablet by mouth daily        vitamin D 1000 units capsule      Take 1 capsule by mouth daily.

## 2018-10-18 NOTE — PATIENT INSTRUCTIONS
If you have any questions regarding your visit, Please contact your care team.    [employer]: [phone]    Women s Health  TELEPHONE NUMBER   XENA Ambriz-    Daija López-Medical Assistant      [facility]  [address] [place] [address]  [phone] ask for [facility] Clinic    Imaging [phone]    [facility]  [address] [address] [place] [address]  [phone]    [facility]  [address]  [place], [place] [address] [phone] ask for Women's [facility]  Imaging [phone]     Urgent Care locations:    [facility] Monday-Friday  5 pm - 9 pm  Saturday and Sunday   9 am - 5 pm    Monday-Friday   11 am - 9 pm  Saturday and Sunday   9 am - 5 pm   [phone]       If you need a medication refill, please contact your pharmacy. Please allow 3 business days for your refill to be completed.  As always, Thank you for trusting us with your healthcare needs!

## 2018-11-05 ENCOUNTER — TRANSFERRED RECORDS (OUTPATIENT)
Dept: HEALTH INFORMATION MANAGEMENT | Facility: CLINIC | Age: 58
End: 2018-11-05

## 2019-05-31 ENCOUNTER — TELEPHONE (OUTPATIENT)
Dept: OBGYN | Facility: CLINIC | Age: 59
End: 2019-05-31

## 2019-05-31 DIAGNOSIS — N95.2 VAGINAL ATROPHY: ICD-10-CM

## 2019-05-31 DIAGNOSIS — N93.9 VAGINAL BLEEDING: ICD-10-CM

## 2019-05-31 NOTE — TELEPHONE ENCOUNTER
[facility] Call Center    Phone Message    May a detailed message be left on voicemail: yes    Reason for Call: Medication Refill Request    Medication Refill Request    ESTRADIOL 0.1MG/GM CREAM     [facility]          Action Taken: Message routed to:  [facility] p [phone]

## 2019-05-31 NOTE — TELEPHONE ENCOUNTER
Pt stating that they switched insurance and she needs a refill so we have [facility]. Pt will make annual exam within the next couple months, the switch of insurance companies has been stressful and pt would like to give it time.     Citlali Ahuja RN on 5/31/2019 at 4:14 PM

## 2019-10-24 ENCOUNTER — OFFICE VISIT (OUTPATIENT)
Dept: OBGYN | Facility: CLINIC | Age: 59
End: 2019-10-24
Payer: COMMERCIAL

## 2019-10-24 VITALS
HEIGHT: 62 IN | DIASTOLIC BLOOD PRESSURE: 81 MMHG | SYSTOLIC BLOOD PRESSURE: 118 MMHG | WEIGHT: 214.6 LBS | HEART RATE: 89 BPM | OXYGEN SATURATION: 99 % | BODY MASS INDEX: 39.49 KG/M2

## 2019-10-24 DIAGNOSIS — Z87.440 PERSONAL HISTORY OF URINARY TRACT INFECTION: ICD-10-CM

## 2019-10-24 DIAGNOSIS — B37.31 YEAST INFECTION OF THE VAGINA: ICD-10-CM

## 2019-10-24 DIAGNOSIS — Z23 NEED FOR PROPHYLACTIC VACCINATION AND INOCULATION AGAINST INFLUENZA: ICD-10-CM

## 2019-10-24 DIAGNOSIS — N95.2 VAGINAL ATROPHY: ICD-10-CM

## 2019-10-24 DIAGNOSIS — Z01.411 ENCOUNTER FOR GYNECOLOGICAL EXAMINATION WITH ABNORMAL FINDING: Primary | ICD-10-CM

## 2019-10-24 PROCEDURE — 99396 PREV VISIT EST AGE 40-64: CPT | Mod: 25 | Performed by: OBSTETRICS & GYNECOLOGY

## 2019-10-24 PROCEDURE — 90682 RIV4 VACC RECOMBINANT DNA IM: CPT | Performed by: OBSTETRICS & GYNECOLOGY

## 2019-10-24 PROCEDURE — 90471 IMMUNIZATION ADMIN: CPT | Performed by: OBSTETRICS & GYNECOLOGY

## 2019-10-24 RX ORDER — FLUCONAZOLE 150 MG/1
150 TABLET ORAL DAILY
Qty: 3 TABLET | Refills: 0 | Status: SHIPPED | OUTPATIENT
Start: 2019-10-24 | End: 2019-10-27

## 2019-10-24 RX ORDER — SULFAMETHOXAZOLE/TRIMETHOPRIM 800-160 MG
1 TABLET ORAL 2 TIMES DAILY
Qty: 20 TABLET | Refills: 0 | Status: SHIPPED | OUTPATIENT
Start: 2019-10-24 | End: 2021-10-14

## 2019-10-24 ASSESSMENT — MIFFLIN-ST. JEOR: SCORE: 1503.73

## 2019-10-24 NOTE — PROGRESS NOTES
Amanda Simeon is a 59 year old female , who presents for annual exam.   No unusual bleeding, no incontinence, or unusual discharge.  She has had some yeast infections, especially if she has a UTI and takes antibiotics.  She would like a prescription for the antibiotics and the diflucan to take as symptoms start.  She uses the estradiol cream and it helps with the urinary and vaginal symptoms.    Her  was forced to retire from [employer] last December.      Past Medical History:   Diagnosis Date     HTN      Rectal cancer (H) 2009     Rectovaginal fistula        Past Surgical History:   Procedure Laterality Date     HYSTERECTOMY       HYSTERECTOMY, PAP NO LONGER INDICATED       low anterior resection of colon  2009    T3N2 moderately differentiated cancer     OOPHORECTOMY      With one sided oophorectomy for fibroid     ostomy reversal  2010     TONSILLECTOMY         OB History    Para Term  AB Living   1 1 0 1 0 1   SAB TAB Ectopic Multiple Live Births   0 0 0 0 1      # Outcome Date GA Lbr Jacoby/2nd Weight Sex Delivery Anes PTL Lv   1  02 27w5d  0.964 kg (2 lb 2 oz) F  EPI Y MALCOLM      Birth Comments: 2 months premature      Complications: Abruptio Placenta       Gyn History:  Gynecological History         No LMP recorded. Patient has had a hysterectomy.      no STD /no PID/no IUD      history of abnormal pap smear:  no  Last pap:   Lab Results   Component Value Date    PAP NIL 10/03/2013           Current Outpatient Medications   Medication Sig Dispense Refill     ESTRADIOL 0.1MG/GM CREAM Insert 1 gram vaginally 2 times each week 60 g 3     fluconazole (DIFLUCAN) 150 MG tablet Take 1 tablet (150 mg) by mouth daily for 3 days 3 tablet 0     Lactobacillus Acid-Pectin (LACTOBACILLUS ACIDOPHILUS) TABS Take 1 tablet by mouth 3 times daily (before meals)       LISINOPRIL 10 MG OR TABS Take  by mouth. Takes half tab daily.        LORazepam (ATIVAN) 0.5 MG  tablet Take 1 tablet (0.5 mg) by mouth every 8 hours as needed for anxiety 20 tablet 0     MULTIVITAL OR Take 1 tablet by mouth daily.       sulfamethoxazole-trimethoprim (BACTRIM DS/SEPTRA DS) 800-160 MG tablet Take 1 tablet by mouth 2 times daily 20 tablet 0     vitamin B complex with vitamin C (VITAMIN  B COMPLEX) TABS tablet Take 1 tablet by mouth daily       acetaminophen (TYLENOL) 325 MG tablet Take 1 tablet by mouth as needed.       ASPIRIN PO Take 81 mg by mouth daily       Calcium-Vitamin D (CALCIUM + D PO) Take 2 tablets by mouth daily.       Cholecalciferol (VITAMIN D) 1000 UNIT capsule Take 1 capsule by mouth daily.       estradiol (ESTRACE) 0.1 MG/GM cream Place vaginally daily 60 g 1       Allergies   Allergen Reactions     Dilaudid [Hydromorphone Hcl] Rash       Social History     Socioeconomic History     Marital status:      Spouse name: Wong     Number of children: 1     Years of education: Not on file     Highest education level: Not on file   Occupational History     Employer: UNEMPLOYED     Employer: [employer]   Social Needs     Financial resource strain: Not on file     Food insecurity:     Worry: Not on file     Inability: Not on file     Transportation needs:     Medical: Not on file     Non-medical: Not on file   Tobacco Use     Smoking status: Never Smoker     Smokeless tobacco: Never Used   Substance and Sexual Activity     Alcohol use: No     Drug use: No     Sexual activity: Yes     Partners: Male     Birth control/protection: Surgical   Lifestyle     Physical activity:     Days per week: Not on file     Minutes per session: Not on file     Stress: Not on file   Relationships     Social connections:     Talks on phone: Not on file     Gets together: Not on file     Attends Faith service: Not on file     Active member of club or organization: Not on file     Attends meetings of clubs or organizations: Not on file     Relationship status: Not on file     Intimate  "partner violence:     Fear of current or ex partner: Not on file     Emotionally abused: Not on file     Physically abused: Not on file     Forced sexual activity: Not on file   Other Topics Concern     Parent/sibling w/ CABG, MI or angioplasty before 65F 55M? Not Asked      Service No     Blood Transfusions Yes     Caffeine Concern No     Occupational Exposure No     Hobby Hazards No     Sleep Concern No     Stress Concern Yes     Weight Concern Not Asked     Special Diet No     Back Care No     Exercise Not Asked     Comment: walks several times a week     Bike Helmet Yes     Seat Belt Yes     Self-Exams Yes   Social History Narrative     Not on file       Family History   Problem Relation Age of Onset     Cancer Paternal Grandmother         Colon cancer age 56     Cardiovascular Mother         CHF     Arthritis Mother      Cardiovascular Father         CHF     Arthritis Father      Neurologic Disorder Brother 53        Parkinsons     Cancer Brother         NMSC         ROS:  All negative except as above.      EXAM:  /81 (BP Location: Right arm, Cuff Size: Adult Large)   Pulse 89   Ht 1.562 m (5' 1.5\")   Wt 97.3 kg (214 lb 9.6 oz)   SpO2 99%   BMI 39.89 kg/m    General:  WNWD female, NAD  Alert  Oriented x 3  Gait:  Normal  Skin:  Normal skin turgor  Neurologic:  CN grossly intact, good sensation.    HEENT:  NC/AT, EOMI  Neck:  No masses palpated, symmetrical, carotids +2/4, no bruits heard  Heart:  RRR  Lungs:  Clear   Breasts:  Symmetrical, no dimpling noted, no masses palpated, no discharge expressed  Abdomen:  Non-tender, non-distended.  Vulva: No external lesions, normal hair distribution, no adenopathy  BUS:  Normal, no masses noted  Urethra:  No hypermobility noted  Urethral meatus:  No masses noted  Vagina: Atrophic, no abnormal discharge, no lesions  Cervix: Absent   Uterus: Absent   Ovaries: No mass, non-tender, mobile  Perianal:  No masses noted.    Rectal exam:  Declined "   Extremities:  No clubbing, cyanosis, or edema      ASSESSMENT/PLAN   Annual examination   Schedule for the mammogram  Vaginal atrophy, refill the estradiol cream.   Low fat diet, weight bearing exercises and self breast exams on a monthly basis have been recommended.  I have discussed with patient the risks, benefits, medications, treatment options and modalities.   I have instructed the patient to call or schedule a follow-up appointment if any problems.

## 2019-11-06 ENCOUNTER — TRANSFERRED RECORDS (OUTPATIENT)
Dept: HEALTH INFORMATION MANAGEMENT | Facility: CLINIC | Age: 59
End: 2019-11-06

## 2020-08-20 DIAGNOSIS — N93.9 VAGINAL BLEEDING: ICD-10-CM

## 2020-08-20 DIAGNOSIS — N95.2 VAGINAL ATROPHY: ICD-10-CM

## 2020-08-20 NOTE — TELEPHONE ENCOUNTER
Requested Prescriptions   Pending Prescriptions Disp Refills     ESTRADIOL 0.1MG/GM CREAM 60 g 3     Sig: Insert 1 gram vaginally 2 times each week       There is no refill protocol information for this order        Medication: ESTRADIOL 0.1MG/GM CREAM  Last Written Prescription Date: 5/31/2019  Last Fill Quantity: 60g  # refills: 3  Last Office Visit: 10/24/2019  Future Office visit: Not scheduled    Routing refill request to provider for review/approval because:  Drug not on the [facility] refill protocol     Patti Felder RN on 8/20/2020 at 11:19 AM

## 2020-10-01 ENCOUNTER — OFFICE VISIT (OUTPATIENT)
Dept: OBGYN | Facility: CLINIC | Age: 60
End: 2020-10-01
Payer: COMMERCIAL

## 2020-10-01 VITALS
DIASTOLIC BLOOD PRESSURE: 79 MMHG | HEIGHT: 62 IN | HEART RATE: 86 BPM | BODY MASS INDEX: 40.28 KG/M2 | SYSTOLIC BLOOD PRESSURE: 125 MMHG | WEIGHT: 218.9 LBS | OXYGEN SATURATION: 96 %

## 2020-10-01 DIAGNOSIS — B37.31 VAGINAL YEAST INFECTION: ICD-10-CM

## 2020-10-01 DIAGNOSIS — Z87.440 PERSONAL HISTORY OF URINARY TRACT INFECTION: Primary | ICD-10-CM

## 2020-10-01 PROCEDURE — 99396 PREV VISIT EST AGE 40-64: CPT | Performed by: OBSTETRICS & GYNECOLOGY

## 2020-10-01 RX ORDER — FLUCONAZOLE 150 MG/1
150 TABLET ORAL ONCE
Qty: 1 TABLET | Refills: 2 | Status: SHIPPED | OUTPATIENT
Start: 2020-10-01 | End: 2020-10-01

## 2020-10-01 RX ORDER — SULFAMETHOXAZOLE/TRIMETHOPRIM 800-160 MG
1 TABLET ORAL 2 TIMES DAILY
Qty: 6 TABLET | Refills: 2 | Status: SHIPPED | OUTPATIENT
Start: 2020-10-01 | End: 2021-10-14

## 2020-10-01 ASSESSMENT — MIFFLIN-ST. JEOR: SCORE: 1518.23

## 2020-10-01 NOTE — PROGRESS NOTES
Amanda Simeon is a 60 year old female , who presents for annual exam.   No unusual bleeding, no incontinence, or unusual discharge.   She is currently using estradiol cream vaginal and vulvar atrophic changes.  She does not have any apparent contraindications to use.  She has not had any apparent complications with it's use.    Past Medical History:   Diagnosis Date     HTN      Rectal cancer (H) 2009     Rectovaginal fistula        Past Surgical History:   Procedure Laterality Date     C FUSION OF TOES Left 2019     HYSTERECTOMY  2004     HYSTERECTOMY, PAP NO LONGER INDICATED       low anterior resection of colon  2009    T3N2 moderately differentiated cancer     OOPHORECTOMY  1995    With one sided oophorectomy for fibroid     ostomy reversal  2010     TONSILLECTOMY         OB History    Para Term  AB Living   1 1 0 1 0 1   SAB TAB Ectopic Multiple Live Births   0 0 0 0 1      # Outcome Date GA Lbr Jacoby/2nd Weight Sex Delivery Anes PTL Lv   1  02 27w5d  0.964 kg (2 lb 2 oz) F  EPI Y MALCOLM      Birth Comments: 2 months premature      Complications: Abruptio Placenta       Gyn History:  Gynecological History         No LMP recorded. Patient has had a hysterectomy.     no STD/no PID/no IUD      history of abnormal pap smear:  NO  Last pap:   Lab Results   Component Value Date    PAP NIL 10/03/2013           Current Outpatient Medications   Medication Sig Dispense Refill     ASPIRIN PO Take 81 mg by mouth daily       Calcium-Vitamin D (CALCIUM + D PO) Take 2 tablets by mouth daily.       Cholecalciferol (VITAMIN D) 1000 UNIT capsule Take 1 capsule by mouth daily.       estradiol (ESTRACE) 0.1 MG/GM cream Place vaginally daily 60 g 1     ESTRADIOL 0.1MG/GM CREAM Insert 1 gram vaginally 2 times each week 60 g 3     LISINOPRIL 10 MG OR TABS Take  by mouth. Takes half tab daily.        LORazepam (ATIVAN) 0.5 MG tablet Take 1 tablet (0.5 mg) by mouth  every 8 hours as needed for anxiety 20 tablet 0     MULTIVITAL OR Take 1 tablet by mouth daily.       vitamin B complex with vitamin C (VITAMIN  B COMPLEX) TABS tablet Take 1 tablet by mouth daily       acetaminophen (TYLENOL) 325 MG tablet Take 1 tablet by mouth as needed.       Lactobacillus Acid-Pectin (LACTOBACILLUS ACIDOPHILUS) TABS Take 1 tablet by mouth 3 times daily (before meals)       sulfamethoxazole-trimethoprim (BACTRIM DS/SEPTRA DS) 800-160 MG tablet Take 1 tablet by mouth 2 times daily (Patient not taking: Reported on 10/1/2020) 20 tablet 0       Allergies   Allergen Reactions     Dilaudid [Hydromorphone Hcl] Rash       Social History     Socioeconomic History     Marital status:      Spouse name: Wong     Number of children: 1     Years of education: Not on file     Highest education level: Not on file   Occupational History     Employer: UNEMPLOYED     Employer: [employer]   Social Needs     Financial resource strain: Not on file     Food insecurity     Worry: Not on file     Inability: Not on file     Transportation needs     Medical: Not on file     Non-medical: Not on file   Tobacco Use     Smoking status: Never Smoker     Smokeless tobacco: Never Used   Substance and Sexual Activity     Alcohol use: No     Drug use: No     Sexual activity: Yes     Partners: Male     Birth control/protection: Surgical   Lifestyle     Physical activity     Days per week: Not on file     Minutes per session: Not on file     Stress: Not on file   Relationships     Social connections     Talks on phone: Not on file     Gets together: Not on file     Attends Taoism service: Not on file     Active member of club or organization: Not on file     Attends meetings of clubs or organizations: Not on file     Relationship status: Not on file     Intimate partner violence     Fear of current or ex partner: Not on file     Emotionally abused: Not on file     Physically abused: Not on file     Forced sexual  "activity: Not on file   Other Topics Concern     Parent/sibling w/ CABG, MI or angioplasty before 65F 55M? Not Asked      Service No     Blood Transfusions Yes     Caffeine Concern No     Occupational Exposure No     Hobby Hazards No     Sleep Concern No     Stress Concern Yes     Weight Concern Not Asked     Special Diet No     Back Care No     Exercise Not Asked     Comment: walks several times a week     Bike Helmet Yes     Seat Belt Yes     Self-Exams Yes   Social History Narrative     Not on file       Family History   Problem Relation Age of Onset     Cancer Paternal Grandmother         Colon cancer age 56     Cardiovascular Mother         CHF     Arthritis Mother      Cardiovascular Father         CHF     Arthritis Father      Neurologic Disorder Brother 53        Parkinsons     Cancer Brother         NMSC         ROS:  All negative except as above.      EXAM:  /79 (BP Location: Right arm, Cuff Size: Adult Large)   Pulse 86   Ht 1.562 m (5' 1.5\")   Wt 99.3 kg (218 lb 14.4 oz)   SpO2 96%   BMI 40.69 kg/m    General:  WNWD female, NAD  Alert  Oriented x 3  Gait:  Normal  Skin:  Normal skin turgor  Neurologic:  CN grossly intact, good sensation.    HEENT:  NC/AT, EOMI  Neck:  No masses palpated, symmetrical, carotids +2/4, no bruits heard  Heart:  RRR  Lungs:  Clear   Breasts:  Symmetrical, no dimpling noted, no masses palpated, no discharge expressed  Abdomen:  Non-tender, non-distended.  Vulva: No external lesions, normal hair distribution, no adenopathy  BUS:  Normal, no masses noted  Urethra:  No hypermobility noted  Urethral meatus:  No masses noted  Vagina: Moist, pink, no abnormal discharge, well rugated, no lesions  Cervix: Smooth, pink, no visible lesions  Uterus: Normal size, anteverted, non-tender, mobile  Ovaries: No mass, non-tender, mobile  Perianal:  No masses noted.    Rectal exam: not performed  Extremities:  No clubbing, cyanosis, or edema      ASSESSMENT/PLAN   Annual " examination   Vaginal atrophy:  Estradiol cream.  She had refills in August and she will call when she needs further refills.   She wants prescriptions for Bactrim and Diflucan to have on hand if she has UTI and Yeast infection.   She will have the mammogram results sent to me for placement into [facility] chart.   Low fat diet, weight bearing exercises and self breast exams on a monthly basis have been recommended.  I have discussed with patient the risks, benefits, medications, treatment options and modalities.   I have instructed the patient to call or schedule a follow-up appointment if any problems.

## 2021-10-14 ENCOUNTER — OFFICE VISIT (OUTPATIENT)
Dept: OBGYN | Facility: CLINIC | Age: 61
End: 2021-10-14
Payer: COMMERCIAL

## 2021-10-14 VITALS
HEART RATE: 69 BPM | HEIGHT: 61 IN | OXYGEN SATURATION: 99 % | WEIGHT: 205.2 LBS | SYSTOLIC BLOOD PRESSURE: 125 MMHG | DIASTOLIC BLOOD PRESSURE: 74 MMHG | BODY MASS INDEX: 38.74 KG/M2

## 2021-10-14 DIAGNOSIS — Z01.411 ENCOUNTER FOR GYNECOLOGICAL EXAMINATION WITH ABNORMAL FINDING: Primary | ICD-10-CM

## 2021-10-14 DIAGNOSIS — N93.9 VAGINAL BLEEDING: ICD-10-CM

## 2021-10-14 DIAGNOSIS — N95.2 VAGINAL ATROPHY: ICD-10-CM

## 2021-10-14 DIAGNOSIS — Z87.440 PERSONAL HISTORY OF URINARY TRACT INFECTION: ICD-10-CM

## 2021-10-14 PROCEDURE — 99396 PREV VISIT EST AGE 40-64: CPT | Performed by: OBSTETRICS & GYNECOLOGY

## 2021-10-14 RX ORDER — SULFAMETHOXAZOLE/TRIMETHOPRIM 800-160 MG
1 TABLET ORAL 2 TIMES DAILY
Qty: 6 TABLET | Refills: 2 | Status: SHIPPED | OUTPATIENT
Start: 2021-10-14

## 2021-10-14 ASSESSMENT — MIFFLIN-ST. JEOR: SCORE: 1447.92

## 2021-10-14 NOTE — PROGRESS NOTES
Amanda Simeon is a 61 year old female , who presents for annual exam.   No unusual bleeding, no incontinence, or unusual discharge.   She is currently using estradiol cream for vaginal symptoms.  She does not have any apparent contraindications to use.  She has not had any apparent complications with it's use.    Past Medical History:   Diagnosis Date     HTN      Rectal cancer (H) 2009     Rectovaginal fistula        Past Surgical History:   Procedure Laterality Date     C FUSION OF TOES Left 2019     HYSTERECTOMY  2004     HYSTERECTOMY, PAP NO LONGER INDICATED       low anterior resection of colon  2009    T3N2 moderately differentiated cancer     OOPHORECTOMY  1995    With one sided oophorectomy for fibroid     ostomy reversal  2010     TONSILLECTOMY         OB History    Para Term  AB Living   1 1 0 1 0 1   SAB TAB Ectopic Multiple Live Births   0 0 0 0 1      # Outcome Date GA Lbr Jacoby/2nd Weight Sex Delivery Anes PTL Lv   1  02 27w5d  0.964 kg (2 lb 2 oz) F  EPI Y MALCOLM      Birth Comments: 2 months premature      Complications: Abruptio Placenta       Gyn History:  Gynecological History         No LMP recorded. Patient has had a hysterectomy.     No STD /No PID/No IUD      history of abnormal pap smear:  No  Last pap:   Lab Results   Component Value Date    PAP NIL 10/03/2013           Current Outpatient Medications   Medication Sig Dispense Refill     acetaminophen (TYLENOL) 325 MG tablet Take 1 tablet by mouth as needed.       ASPIRIN PO Take 81 mg by mouth daily       Calcium-Vitamin D (CALCIUM + D PO) Take 2 tablets by mouth daily.       Cholecalciferol (VITAMIN D) 1000 UNIT capsule Take 1 capsule by mouth daily.       estradiol (ESTRACE) 0.1 MG/GM cream Place vaginally daily 60 g 1     ESTRADIOL 0.1MG/GM CREAM Insert 1 gram vaginally 2 times each week 60 g 3     Lactobacillus Acid-Pectin (LACTOBACILLUS ACIDOPHILUS) TABS Take 1 tablet  by mouth 3 times daily (before meals)       LISINOPRIL 10 MG OR TABS Take  by mouth. Takes half tab daily.        LORazepam (ATIVAN) 0.5 MG tablet Take 1 tablet (0.5 mg) by mouth every 8 hours as needed for anxiety 20 tablet 0     MULTIVITAL OR Take 1 tablet by mouth daily.       sulfamethoxazole-trimethoprim (BACTRIM DS) 800-160 MG tablet Take 1 tablet by mouth 2 times daily 6 tablet 2     sulfamethoxazole-trimethoprim (BACTRIM DS/SEPTRA DS) 800-160 MG tablet Take 1 tablet by mouth 2 times daily 20 tablet 0     vitamin B complex with vitamin C (VITAMIN  B COMPLEX) TABS tablet Take 1 tablet by mouth daily         Allergies   Allergen Reactions     Dilaudid [Hydromorphone Hcl] Rash       Social History     Socioeconomic History     Marital status:      Spouse name: Wong     Number of children: 1     Years of education: Not on file     Highest education level: Not on file   Occupational History     Employer: UNEMPLOYED     Employer: [employer]   Tobacco Use     Smoking status: Never Smoker     Smokeless tobacco: Never Used   Substance and Sexual Activity     Alcohol use: No     Drug use: No     Sexual activity: Yes     Partners: Male     Birth control/protection: Surgical   Other Topics Concern     Parent/sibling w/ CABG, MI or angioplasty before 65F 55M? Not Asked      Service No     Blood Transfusions Yes     Caffeine Concern No     Occupational Exposure No     Hobby Hazards No     Sleep Concern No     Stress Concern Yes     Weight Concern Not Asked     Special Diet No     Back Care No     Exercise Not Asked     Comment: walks several times a week     Bike Helmet Yes     Seat Belt Yes     Self-Exams Yes   Social History Narrative     Not on file     Social Determinants of Health     Financial Resource Strain:      Difficulty of Paying Living Expenses:    Food Insecurity:      Worried About Running Out of Food in the Last Year:      Ran Out of Food in the Last Year:    Transportation Needs:   "    Lack of Transportation (Medical):      Lack of Transportation (Non-Medical):    Physical Activity:      Days of Exercise per Week:      Minutes of Exercise per Session:    Stress:      Feeling of Stress :    Social Connections:      Frequency of Communication with Friends and Family:      Frequency of Social Gatherings with Friends and Family:      Attends Rastafari Services:      Active Member of Clubs or Organizations:      Attends Club or Organization Meetings:      Marital Status:    Intimate Partner Violence:      Fear of Current or Ex-Partner:      Emotionally Abused:      Physically Abused:      Sexually Abused:        Family History   Problem Relation Age of Onset     Cancer Paternal Grandmother         Colon cancer age 56     Cardiovascular Mother         CHF     Arthritis Mother      Cardiovascular Father         CHF     Arthritis Father      Neurologic Disorder Brother 53        Parkinsons     Cancer Brother         NMSC         ROS:  All negative except as above.      EXAM:  /74   Pulse 69   Ht 1.557 m (5' 1.3\")   Wt 93.1 kg (205 lb 3.2 oz)   SpO2 99%   BMI 38.39 kg/m    General:  WNWD female, NAD  Alert  Oriented x 3  Gait:  Normal  Skin:  Normal skin turgor  Neurologic:  CN grossly intact, good sensation.    HEENT:  NC/AT, EOMI  Neck:  No masses palpated, symmetrical, carotids +2/4, no bruits heard  Heart:  RRR  Lungs:  Clear   Breasts:  Symmetrical, no dimpling noted, no masses palpated, no discharge expressed  Abdomen:  Non-tender, non-distended.  Vulva: No external lesions, normal hair distribution, no adenopathy  BUS:  Normal, no masses noted  Urethra:  No hypermobility noted  Urethral meatus:  No masses noted  Vagina: Moist, pink, no abnormal discharge, well rugated, no lesions  Cervix: Absent   Uterus: Absent   Ovaries: No mass, non-tender, mobile  Perianal:  No masses noted.    Rectal exam:  No masses palpated.   Extremities:  No clubbing, cyanosis, or edema      ASSESSMENT/PLAN "   Annual examination.  Pap smear not indicated due to hysterectomy for benign conditions.   Mammogram is scheduled.   Prescription for estradiol and Bactrim given.   Low fat diet, weight bearing exercises and self breast exams on a monthly basis have been recommended.  I have discussed with patient the risks, benefits, medications, treatment options and modalities.   I have instructed the patient to call or schedule a follow-up appointment if any problems.

## 2021-10-15 ENCOUNTER — TELEPHONE (OUTPATIENT)
Dept: OBGYN | Facility: CLINIC | Age: 61
End: 2021-10-15

## 2021-10-15 NOTE — TELEPHONE ENCOUNTER
[facility] Call Center    Phone Message    May a detailed message be left on voicemail: yes     Reason for Call: Symptoms or Concerns     If patient has red-flag symptoms, warm transfer to triage line    Current symptom or concern: Possible UTI    Symptoms have been present for: 1 day    Has patient previously been seen for this? No    Patient wants to know if she is able to get an Rx or what her next steps are. Please advise. Thank you.    Action Taken: Message routed to:  [facility] p [phone]    Travel Screening: Not Applicable

## 2021-10-15 NOTE — TELEPHONE ENCOUNTER
Last seen 10/14/21 for physical exam.    Feels like needs to urinate continuously for last 2 hours. States is prone to UTI's due to Hx colon cancer and fistulas.    Dr. Thakur ordered bactrim for her due to hx of UTI. Pt [facility] would like bactrim RX sent to [facility] in [place] since her mail order service will take too long for it to arrive.    RN spoke with Manuel Treadwell Pharmacist to transfer her bactrim RX.     Stefani Munoz, MICHAELN RN

## 2025-07-29 NOTE — TELEPHONE ENCOUNTER
"Phone call to patient. She stated [facility] was going to dispense vaginal cream instead of vaginal tabs. She stated she was concerned about cost as cream Rx was going to be > $400 for 49 applications. She stated she told pharmacy that she may need to have Rx divided so she can get Rx covered in her [employer] account.   estradiol (VAGIFEM) 10 MCG TABS vaginal tablet 24 tablet 0 3/1/2018  --   Sig: Use one tablet vaginally each night for 2 weeks.  Then use 1 tablet twice weekly.     Phone call to pharmacy at [phone] and spoke to Rosa pharmacist. Verified Rx was received at pharmacy. She stated she did run Rx for tablets and cost is >$400. Pharmacist stated she ran it and one week of meds is > $100. Pharmacist wonders if there is a cheaper Rx. Explained will call patient back and then get a message to Dr. Thakur but he will probably not be able to address until tomorrow.   Phone call to patient. Explained pharmacist did run it for vaginal tablets. Patient stated she checked [employer] account and she could afford if she split the Rx into 2 with the 2 separate set of directions. OR if Dr. Thakur thinks a cheaper alternative would work, she would be interested. Patient will trust Dr. Thakur's orders. Explained will not know Dr. Thakur's orders until tomorrow. Patient agreed to wait. Patient stated a detailed message can be left on either the above number or her [phone].  Patient appreciative of assistance and is just adjusting to \"sticker shock.\"   Will route to Dr. Thakur for review & orders. Amy Toth RN, BAN                                                " Pre-procedure checklist reviewed.